# Patient Record
Sex: MALE | Race: WHITE | NOT HISPANIC OR LATINO | Employment: OTHER | URBAN - METROPOLITAN AREA
[De-identification: names, ages, dates, MRNs, and addresses within clinical notes are randomized per-mention and may not be internally consistent; named-entity substitution may affect disease eponyms.]

---

## 2020-03-03 ENCOUNTER — OFFICE VISIT (OUTPATIENT)
Dept: FAMILY MEDICINE | Facility: CLINIC | Age: 60
End: 2020-03-03
Payer: COMMERCIAL

## 2020-03-03 VITALS
HEIGHT: 68 IN | BODY MASS INDEX: 29.8 KG/M2 | WEIGHT: 196.63 LBS | DIASTOLIC BLOOD PRESSURE: 90 MMHG | SYSTOLIC BLOOD PRESSURE: 130 MMHG | OXYGEN SATURATION: 96 % | HEART RATE: 64 BPM

## 2020-03-03 DIAGNOSIS — Z00.00 ENCOUNTER FOR WELLNESS EXAMINATION IN ADULT: Primary | ICD-10-CM

## 2020-03-03 DIAGNOSIS — Z85.46 HISTORY OF PROSTATE CANCER: ICD-10-CM

## 2020-03-03 DIAGNOSIS — I10 ESSENTIAL HYPERTENSION: ICD-10-CM

## 2020-03-03 PROCEDURE — 99386 PREV VISIT NEW AGE 40-64: CPT | Mod: S$GLB,,, | Performed by: NURSE PRACTITIONER

## 2020-03-03 PROCEDURE — 3075F SYST BP GE 130 - 139MM HG: CPT | Mod: CPTII,S$GLB,, | Performed by: NURSE PRACTITIONER

## 2020-03-03 PROCEDURE — 3080F PR MOST RECENT DIASTOLIC BLOOD PRESSURE >= 90 MM HG: ICD-10-PCS | Mod: CPTII,S$GLB,, | Performed by: NURSE PRACTITIONER

## 2020-03-03 PROCEDURE — 3080F DIAST BP >= 90 MM HG: CPT | Mod: CPTII,S$GLB,, | Performed by: NURSE PRACTITIONER

## 2020-03-03 PROCEDURE — 3075F PR MOST RECENT SYSTOLIC BLOOD PRESS GE 130-139MM HG: ICD-10-PCS | Mod: CPTII,S$GLB,, | Performed by: NURSE PRACTITIONER

## 2020-03-03 PROCEDURE — 99386 PR PREVENTIVE VISIT,NEW,40-64: ICD-10-PCS | Mod: S$GLB,,, | Performed by: NURSE PRACTITIONER

## 2020-03-03 RX ORDER — OLMESARTAN MEDOXOMIL 20 MG/1
20 TABLET ORAL DAILY
Qty: 30 TABLET | Refills: 5 | Status: SHIPPED | OUTPATIENT
Start: 2020-03-03 | End: 2020-03-17

## 2020-03-03 NOTE — PROGRESS NOTES
Clinic Note  3/3/2020      Subjective:       Patient ID:  Ray is a 59 y.o. male being seen in office today to establish care.       Chief Complaint: Establish Care (Pt had BP reading from home was 160/110)    Mr. Winchester is here today to establish care and for elevated blood pressure. No prior PCP.  He has a hx of prostate CA that was treated and followed by Dr. Dhaliwal and a urologist in [place], where he worked for many years. He has not seen Urology or had PSA checked in some time. Today he comes to clinic with complaints of blurred vision, headaches, and facial flushing. These symptoms have been going on for some time but have worsened over the past few days. Denies CP, SOB, or palpitations.  He does have a home B/P cuff and his wife checked it at home this morning and it was 160/110. 130/90 in office today. No prior treatment to date.     Prevention-no wellness labs in some time. Colonoscopy last year that had negative findings: repeat in 10 years.     Family History   Problem Relation Age of Onset    Stroke Mother     Prostate cancer Father      Social History     Socioeconomic History    Marital status:      Spouse name: Not on file    Number of children: Not on file    Years of education: Not on file    Highest education level: Not on file   Occupational History    Not on file   Social Needs    Financial resource strain: Not on file    Food insecurity:     Worry: Not on file     Inability: Not on file    Transportation needs:     Medical: Not on file     Non-medical: Not on file   Tobacco Use    Smoking status: Never Smoker    Smokeless tobacco: Current User     Types: Chew   Substance and Sexual Activity    Alcohol use: Yes    Drug use: Never    Sexual activity: Not on file   Lifestyle    Physical activity:     Days per week: Not on file     Minutes per session: Not on file    Stress: Not on file   Relationships    Social connections:     Talks on phone: Not on file     Gets together:  "Not on file     Attends Shinto service: Not on file     Active member of club or organization: Not on file     Attends meetings of clubs or organizations: Not on file     Relationship status: Not on file   Other Topics Concern    Not on file   Social History Narrative    Not on file     History reviewed. No pertinent surgical history.  Social History     Substance and Sexual Activity   Alcohol Use Yes     Patient has no known allergies.      Review of Systems   Constitutional: Negative for appetite change, diaphoresis, fatigue and fever.   HENT: Negative for congestion, ear pain, postnasal drip, rhinorrhea, sinus pressure, sinus pain and sore throat.    Eyes: Positive for visual disturbance.   Respiratory: Negative for cough, shortness of breath, wheezing and stridor.    Cardiovascular: Negative for chest pain, palpitations and leg swelling.   Gastrointestinal: Negative for abdominal pain, blood in stool, constipation, diarrhea, nausea and vomiting.   Genitourinary: Negative for flank pain, frequency, hematuria and urgency.   Musculoskeletal: Negative for gait problem, joint swelling and myalgias.   Skin: Positive for color change. Negative for rash.   Neurological: Positive for headaches. Negative for dizziness, tremors, seizures, speech difficulty, weakness and numbness.   Psychiatric/Behavioral: Negative for confusion, decreased concentration, hallucinations and sleep disturbance. The patient is not nervous/anxious.               BP (!) 130/90 (BP Location: Left arm, Patient Position: Sitting, BP Method: Large (Manual))   Pulse 64   Ht 5' 8" (1.727 m)   Wt 89.2 kg (196 lb 9.6 oz)   SpO2 96%   BMI 29.89 kg/m²   Estimated body mass index is 29.89 kg/m² as calculated from the following:    Height as of this encounter: 5' 8" (1.727 m).    Weight as of this encounter: 89.2 kg (196 lb 9.6 oz).    Objective:        Physical Exam   Constitutional: He is oriented to person, place, and time. He appears " well-developed and well-nourished.   HENT:   Head: Normocephalic and atraumatic.   Right Ear: Tympanic membrane normal.   Left Ear: Tympanic membrane normal.   Nose: Nose normal.   Mouth/Throat: Uvula is midline, oropharynx is clear and moist and mucous membranes are normal. No oropharyngeal exudate, posterior oropharyngeal edema or posterior oropharyngeal erythema.   Eyes: Pupils are equal, round, and reactive to light. Conjunctivae and EOM are normal.   Neck: Trachea normal, normal range of motion and full passive range of motion without pain. Neck supple. No JVD present. Carotid bruit is not present. No thyroid mass and no thyromegaly present.   Cardiovascular: Normal rate, regular rhythm and intact distal pulses. Exam reveals no gallop and no friction rub.   No murmur heard.  Pulmonary/Chest: Effort normal and breath sounds normal. No stridor. No respiratory distress. He has no wheezes. He has no rhonchi. He has no rales.   Abdominal: Soft. Bowel sounds are normal. He exhibits no distension, no abdominal bruit and no mass. There is no tenderness. There is no CVA tenderness.   Musculoskeletal: Normal range of motion.   Lymphadenopathy:     He has no cervical adenopathy.   Neurological: He is alert and oriented to person, place, and time. He has normal strength. No cranial nerve deficit or sensory deficit.   Skin: Skin is warm, dry and intact. Capillary refill takes less than 2 seconds. No rash noted.   Psychiatric: He has a normal mood and affect. His speech is normal and behavior is normal. Judgment and thought content normal. His mood appears not anxious. Cognition and memory are normal. He does not exhibit a depressed mood. He expresses no suicidal ideation. He expresses no suicidal plans and no homicidal plans.   Nursing note and vitals reviewed.         Assessment and Plan:         Serge was seen today for establish care.    Diagnoses and all orders for this visit:    Encounter for wellness examination in  adult  -     Comprehensive metabolic panel; Future  -     CBC auto differential; Future  -     TSH w/reflex to FT4; Future  -     Urinalysis, Reflex to Urine Culture Urine, Clean Catch; Future  -     PSA, Screening; Future  -     Lipid panel; Future  -     Comprehensive metabolic panel  -     CBC auto differential  -     TSH w/reflex to FT4  -     Urinalysis, Reflex to Urine Culture Urine, Clean Catch  -     PSA, Screening  -     Lipid panel    Essential hypertension  -     Comprehensive metabolic panel; Future  -     CBC auto differential; Future  -     olmesartan (BENICAR) 20 MG tablet; Take 1 tablet (20 mg total) by mouth once daily.  -     Comprehensive metabolic panel  -     CBC auto differential    History of prostate cancer  -     PSA, Screening; Future  -     PSA, Screening    Trial of Olmesartan:  Monitor BP daily; keep log and bring to follow-up. Report to ER with markedly elevated BP, CP, SOB, dsypena, HA, visual disturbances.  Will call pt with lab results; instructed to call office if he has not heard from us 1 week after having labs drawn to ensure we received results.   Patient verbalized understanding and agreed to treatment and plan of care.      Follow up:   Follow up in about 2 weeks (around 3/17/2020), or sooner if needed.            Jenny Palacios NP

## 2020-03-10 ENCOUNTER — TELEPHONE (OUTPATIENT)
Dept: FAMILY MEDICINE | Facility: CLINIC | Age: 60
End: 2020-03-10

## 2020-03-10 NOTE — TELEPHONE ENCOUNTER
Left detailed VM blood sugar slightly elevated at 114, LDL cholesterol 107. Recommend diet and exercise. All other labs unremarkable. Call with any questions or concerns and we can discuss further at follow-up visit next week.

## 2020-03-17 ENCOUNTER — TELEPHONE (OUTPATIENT)
Dept: FAMILY MEDICINE | Facility: CLINIC | Age: 60
End: 2020-03-17

## 2020-03-17 DIAGNOSIS — I10 ESSENTIAL HYPERTENSION: Primary | ICD-10-CM

## 2020-03-17 RX ORDER — OLMESARTAN MEDOXOMIL 40 MG/1
40 TABLET ORAL DAILY
Qty: 30 TABLET | Refills: 5 | Status: SHIPPED | OUTPATIENT
Start: 2020-03-17 | End: 2020-10-16

## 2020-03-17 NOTE — TELEPHONE ENCOUNTER
Mr. Winchester cx appt due to corona virus advisory. Called to check up on his B/P since beginning Olmesartan 20mg two weeks ago. He reports his headaches, blurred vision, and flushing have resolved since starting medication but he is still getting readings of 150/90 some mornings. The only time B/P is regulated is in the evenings when lying in bed.   Instructed pt to take two 20mg tabs to equal 40mg. I will send out new script to pharmacy for new dosage today.   He agreed to continue B/P log and let me know if remains elevated despite dosage increase. He will call office to advise.  Patient verbalized understanding and agreed to treatment and plan of care.

## 2023-12-05 ENCOUNTER — OFFICE VISIT (OUTPATIENT)
Dept: UROLOGY | Facility: CLINIC | Age: 63
End: 2023-12-05
Payer: COMMERCIAL

## 2023-12-05 DIAGNOSIS — N40.0 BENIGN PROSTATIC HYPERPLASIA, UNSPECIFIED WHETHER LOWER URINARY TRACT SYMPTOMS PRESENT: ICD-10-CM

## 2023-12-05 DIAGNOSIS — C61 PROSTATE CANCER: Primary | ICD-10-CM

## 2023-12-05 PROCEDURE — 1160F PR REVIEW ALL MEDS BY PRESCRIBER/CLIN PHARMACIST DOCUMENTED: ICD-10-PCS | Mod: CPTII,93,, | Performed by: NURSE PRACTITIONER

## 2023-12-05 PROCEDURE — 1159F MED LIST DOCD IN RCRD: CPT | Mod: CPTII,93,, | Performed by: NURSE PRACTITIONER

## 2023-12-05 PROCEDURE — 99204 PR OFFICE/OUTPT VISIT, NEW, LEVL IV, 45-59 MIN: ICD-10-PCS | Mod: 93,,, | Performed by: NURSE PRACTITIONER

## 2023-12-05 PROCEDURE — 1160F RVW MEDS BY RX/DR IN RCRD: CPT | Mod: CPTII,93,, | Performed by: NURSE PRACTITIONER

## 2023-12-05 PROCEDURE — 99204 OFFICE O/P NEW MOD 45 MIN: CPT | Mod: 93,,, | Performed by: NURSE PRACTITIONER

## 2023-12-05 PROCEDURE — 4010F PR ACE/ARB THEARPY RXD/TAKEN: ICD-10-PCS | Mod: CPTII,93,, | Performed by: NURSE PRACTITIONER

## 2023-12-05 PROCEDURE — 4010F ACE/ARB THERAPY RXD/TAKEN: CPT | Mod: CPTII,93,, | Performed by: NURSE PRACTITIONER

## 2023-12-05 PROCEDURE — 1159F PR MEDICATION LIST DOCUMENTED IN MEDICAL RECORD: ICD-10-PCS | Mod: CPTII,93,, | Performed by: NURSE PRACTITIONER

## 2023-12-05 RX ORDER — TAMSULOSIN HYDROCHLORIDE 0.4 MG/1
0.4 CAPSULE ORAL DAILY
COMMUNITY
Start: 2023-09-10

## 2023-12-06 NOTE — PROGRESS NOTES
Subjective:       Patient ID: Serge Winchester is a 63 y.o. male.    Chief Complaint: No chief complaint on file.      HPI: 63-year-old male new to the [facility].  History of prostate cancer diagnosed 12 years ago, by an outside urologist.  Path report unavailable at present.  Patient was lost to follow-up with his primary urologist.  At the time of the diagnosis patient was placed on active surveillance after discussion with his active PCP i.e. urologist at that time.  He is had no follow-up.  Per verbalization of patient recent PSA is trended up from the initial PSA at the time of diagnosis.  He has not had any follow-up surveillance biopsies.  Secondary to the rise of the PSA and consultation with his family he decided to seek further definitive care treatment options with urology in [place].  He would like to set up with a local urologist for long-term care post evaluation in [place].  Patient has had no unexplained weight loss or new onset bone pain.  He did have a CT scan for other healthcare issues with identified a potential lesion in the lower lumbar spine.  After initial visitation with the new urologist in [place] recommendations were for MRI of the prostate and bone scan as well as bone density.  Patient is on Flomax 0.4 mg for symptoms of BPH with LUTS.  Symptoms are controlled on current medications.  No other health issues identified at this time.         Past Medical History:   Past Medical History:   Diagnosis Date    Prostate CA     TIA (transient ischemic attack)        Past Surgical Historical: No past surgical history on file.     Medications:   Medication List with Changes/Refills   Current Medications    OLMESARTAN (BENICAR) 40 MG TABLET    Take 1 tablet (40 mg total) by mouth once daily.        Past Social History:   Social History     Socioeconomic History    Marital status:    Tobacco Use    Smoking status: Never    Smokeless tobacco: Current     Types: Chew   Substance and  Sexual Activity    Alcohol use: Yes    Drug use: Never       Allergies: Review of patient's allergies indicates:  No Known Allergies     Family History:   Family History   Problem Relation Age of Onset    Stroke Mother     Prostate cancer Father         Review of Systems:  Review of Systems   Constitutional:  Negative for activity change and appetite change.   HENT:  Negative for congestion and dental problem.    Eyes:  Negative for visual disturbance.   Respiratory:  Negative for chest tightness and shortness of breath.    Cardiovascular:  Negative for chest pain.   Gastrointestinal:  Negative for abdominal distention and abdominal pain.   Genitourinary:  Negative for decreased urine volume, difficulty urinating, dysuria, enuresis, flank pain, frequency, genital sores, hematuria, penile discharge, penile pain, penile swelling, scrotal swelling, testicular pain and urgency.   Musculoskeletal:  Negative for back pain and neck pain.   Skin:  Negative for color change.   Neurological:  Negative for dizziness.   Hematological:  Negative for adenopathy.   Psychiatric/Behavioral:  Negative for agitation, behavioral problems and confusion.        Physical Exam:  Physical Exam  Constitutional:       Appearance: He is well-developed.   HENT:      Head: Normocephalic.   Eyes:      General: No scleral icterus.  Pulmonary:      Effort: Pulmonary effort is normal.      Breath sounds: Normal breath sounds.   Abdominal:      General: There is no distension.      Palpations: Abdomen is soft.      Tenderness: There is no abdominal tenderness.      Hernia: No hernia is present. There is no hernia in the right inguinal area or left inguinal area.   Genitourinary:     Penis: Normal.       Testes: Normal. Cremasteric reflex is present.      Comments: NAE deferred as patient has known history of prostate cancer.  We are proceeding forward currently with prostates cancer staging as patient is seeking definitive care with Urology in  [place]  Musculoskeletal:      Cervical back: Normal range of motion.   Skin:     General: Skin is warm and dry.   Neurological:      Mental Status: He is alert and oriented to person, place, and time.         Assessment/Plan:   Prostate cancer--currently we are establishing patient for long-term care here in the practice.  His last PSA was proximally 2 months ago with his PCP.  Will attempt to get copies of that PSA as well as copies of his previous urologic records from his prior urologist here locally.  A written order was given the patient for to obtain an MRI/bone scan as requested by his providing provider in [place] i.e. urologist.  We will forward copies of the report as well as patient will bring CDs of the study to that provider.  Long-term care will be worked in conjunction with that provider here forward    BPH with LUTS--patient will continue Flomax 0.4 mg daily with an evening meal he is doing well no UA no voiding complaints adequate medication on hand    Problem List Items Addressed This Visit    None       Established Patient - Audio Only Telehealth Visit     The patient location is:   The chief complaint leading to consultation is:   Visit type: Virtual visit with audio only (telephone)  Total time spent with patient:        The reason for the audio only service rather than synchronous audio and video virtual visit was related to technical difficulties or patient preference/necessity.     Each patient to whom I provide medical services by telemedicine is:  (1) informed of the relationship between the physician and patient and the respective role of any other health care provider with respect to management of the patient; and (2) notified that they may decline to receive medical services by telemedicine and may withdraw from such care at any time. Patient verbally consented to receive this service via voice-only telephone call.       HPI:      Assessment and plan:                          This  service was not originating from a related E/M service provided within the previous 7 days nor will  to an E/M service or procedure within the next 24 hours or my soonest available appointment.  Prevailing standard of care was able to be met in this audio-only visit.

## 2025-03-28 DIAGNOSIS — C61 PROSTATE CANCER: Primary | ICD-10-CM

## 2025-04-14 ENCOUNTER — OFFICE VISIT (OUTPATIENT)
Dept: UROLOGY | Facility: CLINIC | Age: 65
End: 2025-04-14
Payer: MEDICARE

## 2025-04-14 VITALS
OXYGEN SATURATION: 98 % | BODY MASS INDEX: 28.79 KG/M2 | SYSTOLIC BLOOD PRESSURE: 138 MMHG | HEART RATE: 57 BPM | WEIGHT: 190 LBS | DIASTOLIC BLOOD PRESSURE: 82 MMHG | HEIGHT: 68 IN

## 2025-04-14 DIAGNOSIS — C61 PROSTATE CANCER: ICD-10-CM

## 2025-04-14 LAB
BILIRUBIN, UA POC OHS: NEGATIVE
BLOOD, UA POC OHS: ABNORMAL
CLARITY, UA POC OHS: CLEAR
COLOR, UA POC OHS: YELLOW
GLUCOSE, UA POC OHS: NEGATIVE
KETONES, UA POC OHS: NEGATIVE
LEUKOCYTES, UA POC OHS: NEGATIVE
NITRITE, UA POC OHS: NEGATIVE
PH, UA POC OHS: 6
PROTEIN, UA POC OHS: NEGATIVE
PSA, DIAGNOSTIC: 9.29 NG/ML (ref 0–4)
SPECIFIC GRAVITY, UA POC OHS: 1.01
UROBILINOGEN, UA POC OHS: 0.2

## 2025-04-14 PROCEDURE — 99214 OFFICE O/P EST MOD 30 MIN: CPT | Mod: S$PBB,,, | Performed by: NURSE PRACTITIONER

## 2025-04-14 NOTE — PROGRESS NOTES
Subjective:       Patient ID: Serge Winchester is a 65 y.o. male.    Chief Complaint: No chief complaint on file.      HPI: 65-year-old male, established patient, last seen December 2023.    Patient has history of prostate cancer.  Patient was initially diagnosed by Dr. Rodriguez about 12 years ago.    Patient recently has been under the evaluation with Dr. Reynolds at [facility] in [facility].  He had a recent biopsy in January 2024.  One sample was positive Jcarlos 3+4=7.  Decipher score of 0.28 with a 1.1% risk factor.  Patient was considered low risk.      According to the patient, Dr. Reynolds recently recommended prostatectomy.    States he has been having lower back pain.  States his pain is severe at times.  He had a CT showing some abnormality in the L5 suspicious.  He has a PET scan pending.    He denies any unexpected weight loss.  Denies any blood in urine.  States he does have some difficulty voiding.  However, he is not taking Flomax as directed.             Past Medical History:   Past Medical History:   Diagnosis Date    Prostate CA     TIA (transient ischemic attack)        Past Surgical Historical:   Past Surgical History:   Procedure Laterality Date    PROSTATE BIOPSY          Medications:   Medication List with Changes/Refills   Current Medications    TAMSULOSIN (FLOMAX) 0.4 MG CAP    Take 0.4 mg by mouth once daily.        Past Social History: Social History[1]    Allergies: Review of patient's allergies indicates:  No Known Allergies     Family History:   Family History   Problem Relation Name Age of Onset    Stroke Mother      Prostate cancer Father          Review of Systems:  Review of Systems   Constitutional:  Negative for activity change and appetite change.   HENT:  Negative for congestion and dental problem.    Eyes:  Negative for visual disturbance.   Respiratory:  Negative for chest tightness and shortness of breath.    Cardiovascular:  Negative for chest pain.   Gastrointestinal:   Negative for abdominal distention and abdominal pain.   Genitourinary:  Negative for decreased urine volume, difficulty urinating, dysuria, enuresis, flank pain, frequency, genital sores, hematuria, penile discharge, penile pain, penile swelling, scrotal swelling, testicular pain and urgency.   Musculoskeletal:  Negative for back pain and neck pain.   Skin:  Negative for color change.   Neurological:  Negative for dizziness.   Hematological:  Negative for adenopathy.   Psychiatric/Behavioral:  Negative for agitation, behavioral problems and confusion.        Physical Exam:  Physical Exam  Vitals and nursing note reviewed.   Constitutional:       Appearance: He is well-developed.   HENT:      Head: Normocephalic.   Eyes:      Pupils: Pupils are equal, round, and reactive to light.   Cardiovascular:      Rate and Rhythm: Normal rate and regular rhythm.      Heart sounds: Normal heart sounds.   Pulmonary:      Effort: Pulmonary effort is normal.      Breath sounds: Normal breath sounds.   Abdominal:      General: Bowel sounds are normal.      Palpations: Abdomen is soft.   Musculoskeletal:         General: Normal range of motion.      Cervical back: Normal range of motion and neck supple.   Skin:     General: Skin is warm and dry.   Neurological:      Mental Status: He is alert and oriented to person, place, and time.   Psychiatric:         Behavior: Behavior normal.       Urinalysis:  Trace intact blood, red blood cells 0-2.    Assessment/Plan:   Prostate cancer: Check the patient's PSA.  We will notify him of the results.    Patient has been having lower back pain with abnormal lesion noted on L5.  He has a PET scan pending.    Discussed recommendations of Dr. Reynolds.  Discussed prostatectomy versus radiation treatment.    At this time will wait for PET scan findings.  He will notify us when he has a done.      Patient follow-up in 1 month, sooner if needed.  Problem List Items Addressed This Visit    None  Visit  Diagnoses         Prostate cancer        Relevant Orders    POCT Urinalysis(Instrument) (Completed)    Prostate Specific Antigen, Diagnostic                      [1]   Social History  Socioeconomic History    Marital status:    Tobacco Use    Smoking status: Never     Passive exposure: Never    Smokeless tobacco: Current     Types: Chew   Substance and Sexual Activity    Alcohol use: Yes    Drug use: Never    Sexual activity: Yes     Partners: Female     Social Drivers of Health      Received from [facility]    Housing Stability

## 2025-04-15 ENCOUNTER — RESULTS FOLLOW-UP (OUTPATIENT)
Dept: UROLOGY | Facility: CLINIC | Age: 65
End: 2025-04-15

## 2025-04-15 ENCOUNTER — TELEPHONE (OUTPATIENT)
Dept: UROLOGY | Facility: CLINIC | Age: 65
End: 2025-04-15
Payer: MEDICARE

## 2025-04-15 DIAGNOSIS — C61 PROSTATE CANCER: Primary | ICD-10-CM

## 2025-04-15 NOTE — TELEPHONE ENCOUNTER
Pt states at apt 4/14/25 LINDA DANG had a doctor in mind at [facility] for spot on spine. Pt would like referral asap. He will proceed with PET scan if it gets schedule prior to going to [place].

## 2025-04-15 NOTE — TELEPHONE ENCOUNTER
Patient was called and informed that his PSA is 9.29. Patient was asked if he knows when his PET scan is and he stated they have not called him yet. He was then informed that once it is done to call us and le us know so we can schedule a follow up appointment. Patient verbalized understanding.     ----- Message from Andrew Brody NP sent at 4/15/2025  7:57 AM CDT -----  PSA is 9.29.  Patient has a pending PET scan.    Please remind patient to notify us when he has his PET scan done.  Follow-up to be arranged pending PET scan.  ----- Message -----  From: Jenny Ragsdale  Sent: 4/14/2025  11:33 AM CDT  To: Andrew Brody NP

## 2025-04-15 NOTE — TELEPHONE ENCOUNTER
----- Message from Mirza sent at 4/15/2025 12:56 PM CDT -----  Contact: self  Type:  Needs Medical AdviceWho Called: Serge Novoaould the patient rather a call back or a response via [employer]? Call Se Call Back Number: [phone] Information: pt requesting a call back regarding needing to know if he needs a referral for the dr he has to see in [place] or if he can just go. States he and gamal discussed this at appt yesterday

## 2025-04-17 ENCOUNTER — TELEPHONE (OUTPATIENT)
Dept: UROLOGY | Facility: CLINIC | Age: 65
End: 2025-04-17
Payer: MEDICARE

## 2025-04-17 NOTE — TELEPHONE ENCOUNTER
Pt has appointment on 6/17/25 at 10:00AM with Marion Byers.    MD [facility] #[phone].

## 2025-05-14 ENCOUNTER — TELEPHONE (OUTPATIENT)
Dept: UROLOGY | Facility: CLINIC | Age: 65
End: 2025-05-14

## 2025-05-14 ENCOUNTER — OFFICE VISIT (OUTPATIENT)
Dept: UROLOGY | Facility: CLINIC | Age: 65
End: 2025-05-14
Payer: MEDICARE

## 2025-05-14 VITALS
RESPIRATION RATE: 18 BRPM | WEIGHT: 190 LBS | DIASTOLIC BLOOD PRESSURE: 90 MMHG | HEIGHT: 68 IN | HEART RATE: 44 BPM | BODY MASS INDEX: 28.79 KG/M2 | OXYGEN SATURATION: 99 % | SYSTOLIC BLOOD PRESSURE: 160 MMHG

## 2025-05-14 DIAGNOSIS — M54.9 BACK PAIN, UNSPECIFIED BACK LOCATION, UNSPECIFIED BACK PAIN LATERALITY, UNSPECIFIED CHRONICITY: ICD-10-CM

## 2025-05-14 DIAGNOSIS — R39.9 LOWER URINARY TRACT SYMPTOMS (LUTS): ICD-10-CM

## 2025-05-14 DIAGNOSIS — R31.29 HEMATURIA, MICROSCOPIC: ICD-10-CM

## 2025-05-14 DIAGNOSIS — C61 PROSTATE CANCER: Primary | ICD-10-CM

## 2025-05-14 LAB
BILIRUBIN, UA POC OHS: NEGATIVE
BLOOD, UA POC OHS: ABNORMAL
CLARITY, UA POC OHS: CLEAR
COLOR, UA POC OHS: YELLOW
GLUCOSE, UA POC OHS: NEGATIVE
KETONES, UA POC OHS: NEGATIVE
LEUKOCYTES, UA POC OHS: NEGATIVE
NITRITE, UA POC OHS: NEGATIVE
PH, UA POC OHS: 5.5
PROTEIN, UA POC OHS: NEGATIVE
SPECIFIC GRAVITY, UA POC OHS: 1.02
UROBILINOGEN, UA POC OHS: 0.2

## 2025-05-14 PROCEDURE — 99214 OFFICE O/P EST MOD 30 MIN: CPT | Mod: S$PBB,,, | Performed by: NURSE PRACTITIONER

## 2025-05-14 RX ORDER — TRAMADOL HYDROCHLORIDE 50 MG/1
50 TABLET, FILM COATED ORAL EVERY 6 HOURS PRN
Qty: 20 EACH | Refills: 0 | Status: SHIPPED | OUTPATIENT
Start: 2025-05-14

## 2025-05-14 NOTE — PROGRESS NOTES
Subjective:       Patient ID: Serge Winchester is a 65 y.o. male.    Chief Complaint: Follow-up      HPI: 65-year-old male, established patient, presents for 6 week follow-up.    Patient has history of prostate cancer.  He is under the care of MD Gtz.    He is complaining of lower back pain.  Did have a CT done showing 1 cm sclerotic lesion in the L3 vertebra could be a bone island or prostate cancer metastasis.  PSA has last visit in April was 9.29.    That time patient had a pending PET scan.  States he is still not have that PET scan done.  Patient did have a bone scan in 2023 which showed no metastasis.  His next appointment MD Gtz is in about 4 weeks.      At last visit he was complaining of some difficulty voiding.    He had been on Flomax but was not taking.  Patient restarted Flomax.  Patient states he has not noticed any major change.  However, he denies any significant frequency, urgency, or nocturia.  States at times it is difficult to start his stream.    He denies any unexpected weight loss.  Denies seeing any blood in the urine.    No other urinary complaints at this time.         Past Medical History:   Past Medical History:   Diagnosis Date    Prostate CA     TIA (transient ischemic attack)        Past Surgical Historical:   Past Surgical History:   Procedure Laterality Date    PROSTATE BIOPSY          Medications:   Medication List with Changes/Refills   New Medications    TRAMADOL (ULTRAM) 50 MG TABLET    Take 1 tablet (50 mg total) by mouth every 6 (six) hours as needed for Pain.   Current Medications    TAMSULOSIN (FLOMAX) 0.4 MG CAP    Take 0.4 mg by mouth once daily.        Past Social History: Social History[1]    Allergies: Review of patient's allergies indicates:  No Known Allergies     Family History:   Family History   Problem Relation Name Age of Onset    Stroke Mother      Prostate cancer Father          Review of Systems:  Review of Systems   Constitutional:  Negative for activity  change and appetite change.   HENT:  Negative for congestion and dental problem.    Eyes:  Negative for visual disturbance.   Respiratory:  Negative for chest tightness and shortness of breath.    Cardiovascular:  Negative for chest pain.   Gastrointestinal:  Negative for abdominal distention and abdominal pain.   Genitourinary:  Negative for decreased urine volume, difficulty urinating, dysuria, enuresis, flank pain, frequency, genital sores, hematuria, penile discharge, penile pain, penile swelling, scrotal swelling, testicular pain and urgency.   Musculoskeletal:  Positive for back pain. Negative for neck pain.   Skin:  Negative for color change.   Neurological:  Negative for dizziness.   Hematological:  Negative for adenopathy.   Psychiatric/Behavioral:  Negative for agitation, behavioral problems and confusion.        Physical Exam:  Physical Exam  Vitals and nursing note reviewed.   Constitutional:       Appearance: He is well-developed.   HENT:      Head: Normocephalic.   Eyes:      Pupils: Pupils are equal, round, and reactive to light.   Cardiovascular:      Rate and Rhythm: Normal rate and regular rhythm.      Heart sounds: Normal heart sounds.   Pulmonary:      Effort: Pulmonary effort is normal.      Breath sounds: Normal breath sounds.   Abdominal:      General: Bowel sounds are normal.      Palpations: Abdomen is soft.   Musculoskeletal:         General: Normal range of motion.      Cervical back: Normal range of motion and neck supple.   Skin:     General: Skin is warm and dry.   Neurological:      Mental Status: He is alert and oriented to person, place, and time.   Psychiatric:         Behavior: Behavior normal.       Urinalysis: Small blood, red blood cells 0-2.    Assessment/Plan:   1. Prostate cancer: Patient's PSA was 9.29 at last visit.    He had a CT showing a lesion on the lumbar vertebrae.    Patient is scheduled for a PET scan but has not had it done yet.  Appointment with MD Gtz in  his not till mid June.    Will schedule patient for bone scan.    2. LUTS: Patient continue Flomax 0.4 mg daily.      3. Lower back pain: Will send some tramadol out.      4. Microscopic hematuria:  There is a small amount of blood noted on UA today.  Will repeat UA at next visit.      Tentatively plan follow-up in 1 year, sooner if needed    Problem List Items Addressed This Visit    None  Visit Diagnoses         Prostate cancer    -  Primary    Relevant Medications    traMADoL (ULTRAM) 50 mg tablet    Other Relevant Orders    POCT Urinalysis(Instrument)    NM Bone Scan Whole Body      Lower urinary tract symptoms (LUTS)        Relevant Orders    POCT Urinalysis(Instrument)    NM Bone Scan Whole Body      Back pain, unspecified back location, unspecified back pain laterality, unspecified chronicity        Relevant Medications    traMADoL (ULTRAM) 50 mg tablet    Other Relevant Orders    POCT Urinalysis(Instrument)      Hematuria, microscopic                          [1]   Social History  Socioeconomic History    Marital status:    Tobacco Use    Smoking status: Never     Passive exposure: Never    Smokeless tobacco: Current     Types: Chew   Substance and Sexual Activity    Alcohol use: Yes    Drug use: Never    Sexual activity: Yes     Partners: Female     Social Drivers of Health      Received from [employer]    Housing Stability

## 2025-05-14 NOTE — TELEPHONE ENCOUNTER
Returned call and informed him that Christian ordered a bone scan and called out some tramadol.           ----- Message from [facility] sent at 5/14/2025 10:05 AM CDT -----  Contact: BASSAM BOCANEGRA [[id number]]  .Type:  Patient Returning CallWho Called:BASSAM BOCANEGRA [[id number]]Who Left Message for Patient:Renee Does the patient know what this is regarding?:missed call Would the patient rather a call back or a response via [facility]? Call Best Call Back Number:[phone] Information:

## 2025-05-27 ENCOUNTER — RESULTS FOLLOW-UP (OUTPATIENT)
Dept: UROLOGY | Facility: CLINIC | Age: 65
End: 2025-05-27

## 2025-05-28 ENCOUNTER — TELEPHONE (OUTPATIENT)
Dept: UROLOGY | Facility: CLINIC | Age: 65
End: 2025-05-28
Payer: MEDICARE

## 2025-05-28 NOTE — TELEPHONE ENCOUNTER
Spoke with Patient - Spoke with Mr Winchester, let him jackykeshia rochaher ewas now evidence of metastasis, per Andrew Brody NP, Mr Winchester verbalized understanding       ----- Message from Andrew Brody NP sent at 5/27/2025  4:31 PM CDT -----  Bone scan is good with no evidence of metastasis  ----- Message -----  From: Luisa Ruvalcaba LPN  Sent: 5/27/2025   3:29 PM CDT  To: Andrew Brody NP